# Patient Record
Sex: FEMALE | Race: WHITE | NOT HISPANIC OR LATINO | URBAN - METROPOLITAN AREA
[De-identification: names, ages, dates, MRNs, and addresses within clinical notes are randomized per-mention and may not be internally consistent; named-entity substitution may affect disease eponyms.]

---

## 2018-05-02 ENCOUNTER — OFFICE VISIT (OUTPATIENT)
Dept: URGENT CARE | Facility: CLINIC | Age: 43
End: 2018-05-02
Payer: COMMERCIAL

## 2018-05-02 VITALS
HEART RATE: 84 BPM | RESPIRATION RATE: 24 BRPM | TEMPERATURE: 98.1 F | BODY MASS INDEX: 35.34 KG/M2 | HEIGHT: 64 IN | DIASTOLIC BLOOD PRESSURE: 90 MMHG | WEIGHT: 207 LBS | SYSTOLIC BLOOD PRESSURE: 132 MMHG

## 2018-05-02 DIAGNOSIS — T78.40XA ALLERGIC REACTION, INITIAL ENCOUNTER: Primary | ICD-10-CM

## 2018-05-02 PROCEDURE — 99213 OFFICE O/P EST LOW 20 MIN: CPT | Performed by: NURSE PRACTITIONER

## 2018-05-02 RX ORDER — NAPROXEN 500 MG/1
TABLET ORAL
COMMUNITY
Start: 2018-04-23

## 2018-05-02 RX ORDER — PREDNISONE 10 MG/1
TABLET ORAL
Qty: 21 TABLET | Refills: 0 | Status: SHIPPED | OUTPATIENT
Start: 2018-05-02

## 2018-05-02 RX ORDER — ACETAMINOPHEN, ASPIRIN AND CAFFEINE 250; 250; 65 MG/1; MG/1; MG/1
1 TABLET, FILM COATED ORAL EVERY 6 HOURS PRN
COMMUNITY

## 2018-05-02 RX ORDER — METOPROLOL SUCCINATE 50 MG/1
25 TABLET, EXTENDED RELEASE ORAL DAILY
COMMUNITY

## 2018-05-02 RX ORDER — AMOXICILLIN 250 MG/1
500 CAPSULE ORAL EVERY 8 HOURS SCHEDULED
COMMUNITY

## 2018-05-02 NOTE — PROGRESS NOTES
NAME: Blayne López is a 38 y o  female  : 1975    MRN: [id number]      Assessment and Plan   Allergic reaction, initial encounter [T78 [id number]]  1  Allergic reaction, initial encounter  predniSONE 10 mg tablet       Rodri Pearl was seen today for allergic reaction  Diagnoses and all orders for this visit:    Allergic reaction, initial encounter  -     predniSONE 10 mg tablet; Take 6 tablets today and decrease by one tablet daily until gone        Patient Instructions   Patient Instructions   Take meds as directed  Follow up with pcp   Follow up with dermatology   Symptoms worsen go to the ER  Take zyrtec or allegra daily       Proceed to ER if symptoms worsen  Chief Complaint     Chief Complaint   Patient presents with    Allergic Reaction     swollen, painful fingers, swollen lips, slight rash right elbow         History of Present Illness     Patient was using a copper sleeve, compression sleeve to the right hand and feels that she is having an allergic reaction  She feels that her lips are swollen and her hands and fingers started this morning  She started using the sleeves over the weekend and hat is when the symptoms of the hands have been bothering her  She has never had this reaction prior  She has not taken anything for the symptoms and is allergic to benadryl and has not taken anything for it  Review of Systems   Review of Systems   Constitutional: Negative  HENT: Negative  Eyes: Negative  Respiratory: Negative  Cardiovascular: Negative for chest pain  Gastrointestinal: Negative  Endocrine: Negative  Genitourinary: Negative  Musculoskeletal: Negative  Skin: Positive for rash (palms)           Current Medications       Current Outpatient Prescriptions:     amoxicillin (AMOXIL) 250 mg capsule, Take 500 mg by mouth every 8 (eight) hours, Disp: , Rfl:     aspirin-acetaminophen-caffeine (EXCEDRIN MIGRAINE) 681-156-73 MG per tablet, Take 1 tablet by mouth every 6 (six) hours as needed for headaches, Disp: , Rfl:     metoprolol succinate (TOPROL-XL) 50 mg 24 hr tablet, Take 25 mg by mouth daily, Disp: , Rfl:     naproxen (NAPROSYN) 500 mg tablet, , Disp: , Rfl:     predniSONE 10 mg tablet, Take 6 tablets today and decrease by one tablet daily until gone, Disp: 21 tablet, Rfl: 0    Current Allergies     Allergies as of 05/02/2018 - Reviewed 05/02/2018   Allergen Reaction Noted    Sulfa antibiotics Hives and Fever 05/02/2018              No past medical history on file  No past surgical history on file  No family history on file  Medications have been verified  The following portions of the patient's history were reviewed and updated as appropriate: allergies, current medications, past family history, past medical history, past social history, past surgical history and problem list     Objective   /90   Pulse 84   Temp 98 1 °F (36 7 °C)   Resp (!) 24   Ht 5' 4" (1 626 m)   Wt 93 9 kg (207 lb)   Breastfeeding? No   BMI 35 53 kg/m²      Physical Exam     Physical Exam   Constitutional: She appears well-developed and well-nourished  HENT:   Head: Not macrocephalic  Right Ear: Tympanic membrane normal    Left Ear: Tympanic membrane normal    Nose: Nose normal    Mouth/Throat: Uvula is midline, oropharynx is clear and moist and mucous membranes are normal    Skin: Rash noted              MilALONDRA Skaggs

## 2018-05-02 NOTE — LETTER
May 2, 2018     Patient: Nadya Edmond   YOB: 1975   Date of Visit: 5/2/2018       To Whom It May Concern: It is my medical opinion that Nadya Edmond may return to work on 05/03/2018  If you have any questions or concerns, please don't hesitate to call           Sincerely,        ALONDRA Ag    CC: No Recipients

## 2018-05-02 NOTE — PATIENT INSTRUCTIONS
Take meds as directed  Follow up with pcp   Follow up with dermatology   Symptoms worsen go to the ER  Take zyrtec or allegra daily